# Patient Record
Sex: FEMALE | Race: OTHER | Employment: FULL TIME | URBAN - METROPOLITAN AREA
[De-identification: names, ages, dates, MRNs, and addresses within clinical notes are randomized per-mention and may not be internally consistent; named-entity substitution may affect disease eponyms.]

---

## 2024-10-31 ENCOUNTER — OFFICE VISIT (OUTPATIENT)
Age: 34
End: 2024-10-31

## 2024-10-31 VITALS
HEART RATE: 111 BPM | OXYGEN SATURATION: 97 % | TEMPERATURE: 98.4 F | HEIGHT: 64 IN | WEIGHT: 128 LBS | RESPIRATION RATE: 18 BRPM | DIASTOLIC BLOOD PRESSURE: 73 MMHG | BODY MASS INDEX: 21.85 KG/M2 | SYSTOLIC BLOOD PRESSURE: 123 MMHG

## 2024-10-31 DIAGNOSIS — B34.9 VIRAL ILLNESS: Primary | ICD-10-CM

## 2024-10-31 LAB
INFLUENZA A ANTIGEN, POC: NEGATIVE
INFLUENZA B ANTIGEN, POC: NEGATIVE
Lab: NORMAL
PERFORMING INSTRUMENT: NORMAL
QC PASS/FAIL: NORMAL
SARS-COV-2, POC: NORMAL

## 2024-10-31 RX ORDER — METHOCARBAMOL 750 MG/1
750 TABLET, FILM COATED ORAL 4 TIMES DAILY
Qty: 40 TABLET | Refills: 0 | Status: SHIPPED | OUTPATIENT
Start: 2024-10-31 | End: 2024-11-10

## 2024-10-31 RX ORDER — IBUPROFEN 600 MG/1
600 TABLET, FILM COATED ORAL 3 TIMES DAILY PRN
Qty: 21 TABLET | Refills: 0 | Status: SHIPPED | OUTPATIENT
Start: 2024-10-31

## 2024-10-31 RX ORDER — KETOROLAC TROMETHAMINE 30 MG/ML
30 INJECTION, SOLUTION INTRAMUSCULAR; INTRAVENOUS ONCE
Status: COMPLETED | OUTPATIENT
Start: 2024-10-31 | End: 2024-10-31

## 2024-10-31 RX ADMIN — KETOROLAC TROMETHAMINE 30 MG: 30 INJECTION, SOLUTION INTRAMUSCULAR; INTRAVENOUS at 18:31

## 2024-10-31 ASSESSMENT — ENCOUNTER SYMPTOMS
SINUS PRESSURE: 0
NAUSEA: 1
SINUS PAIN: 0
WHEEZING: 0
RHINORRHEA: 1
PHOTOPHOBIA: 0
STRIDOR: 0
SORE THROAT: 0
COUGH: 0
TROUBLE SWALLOWING: 0
ABDOMINAL PAIN: 0
VOMITING: 0
CHEST TIGHTNESS: 0

## 2024-10-31 NOTE — PATIENT INSTRUCTIONS
URI Instructions:  - These illness typically are self limiting and respond to conservative care. They do not require antibiotics.  - Increase clear fluids.  - Get enough rest. Make sure you are eating healthy and limit the amount of alcohol intake  - Do not smoke and limit second hand exposure.  - If you are feeling congested you can utilize Mucinex 600 mg twice daily   - Pseudophed 60 mg every 6 hours as needed.  - Zyrtec or Allegra OTC as directed.  - Follow-up with you primary care physician in 5-7 days if symptoms are not improving.

## 2024-10-31 NOTE — PROGRESS NOTES
Olga Machado (:  1990) is a 33 y.o. female,New patient, here for evaluation of the following chief complaint(s):  Fever (Fever of 102-103 with headaches  x 5 days )      ASSESSMENT/PLAN:    ICD-10-CM    1. Viral illness  B34.9 POCT COVID-19, Antigen     POCT Influenza A/B Antigen (BD Veritor)     ketorolac (TORADOL) injection 30 mg     ibuprofen (ADVIL;MOTRIN) 600 MG tablet     methocarbamol (ROBAXIN-750) 750 MG tablet        MDM: Flu like illness X 5 days. Headache is main symptom.  Patient denies any ST, ear or sinus pain, drainage or congestion. No cough. COVID and FLU negative. Toradol given in clinic for symptom relief. Home advice given. Ibuprofen and Robaxin for head and neck pain. No nuchal rigidity.     Dx Disposition:   Education and handout provided on diagnosis and management of symptoms.   AVS reviewed with patient. Follow up as needed in UC or with PCP for new or worsening symptoms.   Return if symptoms worsen or fail to improve.    SUBJECTIVE/OBJECTIVE:  The patient is a 33 year old female who presents with a 5 day history of fever: 100.5-101.9, chills, myalgia, headache and fatigue.  The patient has tried APAP to help with their symptoms and has had mild relief. They have noted positive for achy head and muscle pain and deny cough, sore throat, tinnitus, vertigo, frequent URI's, sinus trouble, persistent sore throat, tonsillitis, dental problem, and hoarseness. They have been able to tolerate fluids. They deny N/V/D. They have not been exposed to illness. They have not had any recent travel.        Fever   Associated symptoms include headaches and nausea. Pertinent negatives include no abdominal pain, chest pain, congestion, coughing, ear pain, rash, sore throat, urinary pain, vomiting or wheezing.       Vitals:    10/31/24 1801   BP: 123/73   Site: Left Upper Arm   Position: Sitting   Cuff Size: Small Adult   Pulse: (!) 111   Resp: 18   Temp: 98.4 °F (36.9 °C)   TempSrc: Oral   SpO2: